# Patient Record
Sex: FEMALE | Race: ASIAN | NOT HISPANIC OR LATINO | Employment: PART TIME | URBAN - METROPOLITAN AREA
[De-identification: names, ages, dates, MRNs, and addresses within clinical notes are randomized per-mention and may not be internally consistent; named-entity substitution may affect disease eponyms.]

---

## 2017-10-14 ENCOUNTER — HOSPITAL ENCOUNTER (INPATIENT)
Facility: MEDICAL CENTER | Age: 72
LOS: 2 days | DRG: 282 | End: 2017-10-16
Attending: EMERGENCY MEDICINE | Admitting: INTERNAL MEDICINE
Payer: MEDICARE

## 2017-10-14 ENCOUNTER — HOSPITAL ENCOUNTER (OUTPATIENT)
Dept: RADIOLOGY | Facility: MEDICAL CENTER | Age: 72
End: 2017-10-14

## 2017-10-14 ENCOUNTER — RESOLUTE PROFESSIONAL BILLING HOSPITAL PROF FEE (OUTPATIENT)
Dept: HOSPITALIST | Facility: MEDICAL CENTER | Age: 72
End: 2017-10-14
Payer: MEDICARE

## 2017-10-14 DIAGNOSIS — I10 ESSENTIAL HYPERTENSION: ICD-10-CM

## 2017-10-14 DIAGNOSIS — R79.89 ELEVATED TROPONIN: ICD-10-CM

## 2017-10-14 DIAGNOSIS — I24.9 ACUTE CORONARY SYNDROME (HCC): ICD-10-CM

## 2017-10-14 PROBLEM — Z87.891 HISTORY OF TOBACCO ABUSE: Status: ACTIVE | Noted: 2017-10-14

## 2017-10-14 PROBLEM — I21.4 NSTEMI (NON-ST ELEVATED MYOCARDIAL INFARCTION) (HCC): Status: ACTIVE | Noted: 2017-10-14

## 2017-10-14 LAB
ANION GAP SERPL CALC-SCNC: 9 MMOL/L (ref 0–11.9)
APTT PPP: 231.5 SEC (ref 24.7–36)
APTT PPP: 231.6 SEC (ref 24.7–36)
BASOPHILS # BLD AUTO: 1.2 % (ref 0–1.8)
BASOPHILS # BLD: 0.1 K/UL (ref 0–0.12)
BUN SERPL-MCNC: 13 MG/DL (ref 8–22)
CALCIUM SERPL-MCNC: 8.8 MG/DL (ref 8.5–10.5)
CHLORIDE SERPL-SCNC: 106 MMOL/L (ref 96–112)
CO2 SERPL-SCNC: 23 MMOL/L (ref 20–33)
CREAT SERPL-MCNC: 0.69 MG/DL (ref 0.5–1.4)
EKG IMPRESSION: NORMAL
EOSINOPHIL # BLD AUTO: 0.22 K/UL (ref 0–0.51)
EOSINOPHIL NFR BLD: 2.7 % (ref 0–6.9)
ERYTHROCYTE [DISTWIDTH] IN BLOOD BY AUTOMATED COUNT: 46.5 FL (ref 35.9–50)
GFR SERPL CREATININE-BSD FRML MDRD: >60 ML/MIN/1.73 M 2
GLUCOSE SERPL-MCNC: 167 MG/DL (ref 65–99)
HCT VFR BLD AUTO: 43.8 % (ref 37–47)
HGB BLD-MCNC: 14.9 G/DL (ref 12–16)
IMM GRANULOCYTES # BLD AUTO: 0.05 K/UL (ref 0–0.11)
IMM GRANULOCYTES NFR BLD AUTO: 0.6 % (ref 0–0.9)
LYMPHOCYTES # BLD AUTO: 3.41 K/UL (ref 1–4.8)
LYMPHOCYTES NFR BLD: 42.3 % (ref 22–41)
MAGNESIUM SERPL-MCNC: 2 MG/DL (ref 1.5–2.5)
MCH RBC QN AUTO: 32.5 PG (ref 27–33)
MCHC RBC AUTO-ENTMCNC: 34 G/DL (ref 33.6–35)
MCV RBC AUTO: 95.6 FL (ref 81.4–97.8)
MONOCYTES # BLD AUTO: 0.52 K/UL (ref 0–0.85)
MONOCYTES NFR BLD AUTO: 6.5 % (ref 0–13.4)
NEUTROPHILS # BLD AUTO: 3.76 K/UL (ref 2–7.15)
NEUTROPHILS NFR BLD: 46.7 % (ref 44–72)
NRBC # BLD AUTO: 0 K/UL
NRBC BLD AUTO-RTO: 0 /100 WBC
PLATELET # BLD AUTO: 252 K/UL (ref 164–446)
PMV BLD AUTO: 10.8 FL (ref 9–12.9)
POTASSIUM SERPL-SCNC: 3.7 MMOL/L (ref 3.6–5.5)
RBC # BLD AUTO: 4.58 M/UL (ref 4.2–5.4)
SODIUM SERPL-SCNC: 138 MMOL/L (ref 135–145)
TROPONIN I SERPL-MCNC: 0.15 NG/ML (ref 0–0.04)
TSH SERPL DL<=0.005 MIU/L-ACNC: 1.43 UIU/ML (ref 0.3–3.7)
WBC # BLD AUTO: 8.1 K/UL (ref 4.8–10.8)

## 2017-10-14 PROCEDURE — 85730 THROMBOPLASTIN TIME PARTIAL: CPT

## 2017-10-14 PROCEDURE — 80048 BASIC METABOLIC PNL TOTAL CA: CPT

## 2017-10-14 PROCEDURE — 700101 HCHG RX REV CODE 250: Performed by: INTERNAL MEDICINE

## 2017-10-14 PROCEDURE — 85025 COMPLETE CBC W/AUTO DIFF WBC: CPT

## 2017-10-14 PROCEDURE — 700102 HCHG RX REV CODE 250 W/ 637 OVERRIDE(OP): Performed by: INTERNAL MEDICINE

## 2017-10-14 PROCEDURE — 770020 HCHG ROOM/CARE - TELE (206)

## 2017-10-14 PROCEDURE — 94760 N-INVAS EAR/PLS OXIMETRY 1: CPT

## 2017-10-14 PROCEDURE — 99223 1ST HOSP IP/OBS HIGH 75: CPT | Mod: AI | Performed by: INTERNAL MEDICINE

## 2017-10-14 PROCEDURE — 84443 ASSAY THYROID STIM HORMONE: CPT

## 2017-10-14 PROCEDURE — A9270 NON-COVERED ITEM OR SERVICE: HCPCS | Performed by: INTERNAL MEDICINE

## 2017-10-14 PROCEDURE — 700111 HCHG RX REV CODE 636 W/ 250 OVERRIDE (IP)

## 2017-10-14 PROCEDURE — 36415 COLL VENOUS BLD VENIPUNCTURE: CPT

## 2017-10-14 PROCEDURE — 93005 ELECTROCARDIOGRAM TRACING: CPT | Performed by: INTERNAL MEDICINE

## 2017-10-14 PROCEDURE — 93010 ELECTROCARDIOGRAM REPORT: CPT | Performed by: INTERNAL MEDICINE

## 2017-10-14 PROCEDURE — 83735 ASSAY OF MAGNESIUM: CPT

## 2017-10-14 PROCEDURE — 99285 EMERGENCY DEPT VISIT HI MDM: CPT

## 2017-10-14 PROCEDURE — 84484 ASSAY OF TROPONIN QUANT: CPT

## 2017-10-14 PROCEDURE — 93005 ELECTROCARDIOGRAM TRACING: CPT | Performed by: EMERGENCY MEDICINE

## 2017-10-14 PROCEDURE — 700105 HCHG RX REV CODE 258: Performed by: INTERNAL MEDICINE

## 2017-10-14 PROCEDURE — 96365 THER/PROPH/DIAG IV INF INIT: CPT

## 2017-10-14 PROCEDURE — 93005 ELECTROCARDIOGRAM TRACING: CPT

## 2017-10-14 RX ORDER — HEPARIN SODIUM 1000 [USP'U]/ML
3200 INJECTION, SOLUTION INTRAVENOUS; SUBCUTANEOUS PRN
Status: DISCONTINUED | OUTPATIENT
Start: 2017-10-14 | End: 2017-10-15

## 2017-10-14 RX ORDER — LOSARTAN POTASSIUM 50 MG/1
50 TABLET ORAL DAILY
Status: DISCONTINUED | OUTPATIENT
Start: 2017-10-14 | End: 2017-10-16

## 2017-10-14 RX ORDER — OXYCODONE HYDROCHLORIDE 5 MG/1
2.5 TABLET ORAL
Status: DISCONTINUED | OUTPATIENT
Start: 2017-10-14 | End: 2017-10-16 | Stop reason: HOSPADM

## 2017-10-14 RX ORDER — BISACODYL 10 MG
10 SUPPOSITORY, RECTAL RECTAL
Status: DISCONTINUED | OUTPATIENT
Start: 2017-10-14 | End: 2017-10-16 | Stop reason: HOSPADM

## 2017-10-14 RX ORDER — AMOXICILLIN 250 MG
2 CAPSULE ORAL 2 TIMES DAILY
Status: DISCONTINUED | OUTPATIENT
Start: 2017-10-14 | End: 2017-10-16 | Stop reason: HOSPADM

## 2017-10-14 RX ORDER — ONDANSETRON 4 MG/1
4 TABLET, ORALLY DISINTEGRATING ORAL EVERY 4 HOURS PRN
Status: DISCONTINUED | OUTPATIENT
Start: 2017-10-14 | End: 2017-10-16 | Stop reason: HOSPADM

## 2017-10-14 RX ORDER — MORPHINE SULFATE 4 MG/ML
2 INJECTION, SOLUTION INTRAMUSCULAR; INTRAVENOUS
Status: DISCONTINUED | OUTPATIENT
Start: 2017-10-14 | End: 2017-10-16 | Stop reason: HOSPADM

## 2017-10-14 RX ORDER — MORPHINE SULFATE 4 MG/ML
2-4 INJECTION, SOLUTION INTRAMUSCULAR; INTRAVENOUS
Status: DISCONTINUED | OUTPATIENT
Start: 2017-10-14 | End: 2017-10-16 | Stop reason: HOSPADM

## 2017-10-14 RX ORDER — ATORVASTATIN CALCIUM 40 MG/1
40 TABLET, FILM COATED ORAL EVERY EVENING
Status: DISCONTINUED | OUTPATIENT
Start: 2017-10-14 | End: 2017-10-15

## 2017-10-14 RX ORDER — OXYCODONE HYDROCHLORIDE 5 MG/1
5 TABLET ORAL
Status: DISCONTINUED | OUTPATIENT
Start: 2017-10-14 | End: 2017-10-16 | Stop reason: HOSPADM

## 2017-10-14 RX ORDER — NITROGLYCERIN 0.4 MG/1
0.4 TABLET SUBLINGUAL
Status: DISCONTINUED | OUTPATIENT
Start: 2017-10-14 | End: 2017-10-16 | Stop reason: HOSPADM

## 2017-10-14 RX ORDER — ONDANSETRON 2 MG/ML
4 INJECTION INTRAMUSCULAR; INTRAVENOUS EVERY 4 HOURS PRN
Status: DISCONTINUED | OUTPATIENT
Start: 2017-10-14 | End: 2017-10-15

## 2017-10-14 RX ORDER — LABETALOL HYDROCHLORIDE 5 MG/ML
10 INJECTION, SOLUTION INTRAVENOUS ONCE
Status: ACTIVE | OUTPATIENT
Start: 2017-10-14 | End: 2017-10-15

## 2017-10-14 RX ORDER — POLYETHYLENE GLYCOL 3350 17 G/17G
1 POWDER, FOR SOLUTION ORAL
Status: DISCONTINUED | OUTPATIENT
Start: 2017-10-14 | End: 2017-10-16 | Stop reason: HOSPADM

## 2017-10-14 RX ORDER — SODIUM CHLORIDE 9 MG/ML
INJECTION, SOLUTION INTRAVENOUS CONTINUOUS
Status: DISCONTINUED | OUTPATIENT
Start: 2017-10-14 | End: 2017-10-16 | Stop reason: HOSPADM

## 2017-10-14 RX ORDER — CARVEDILOL 3.12 MG/1
3.12 TABLET ORAL 2 TIMES DAILY WITH MEALS
Status: DISCONTINUED | OUTPATIENT
Start: 2017-10-14 | End: 2017-10-16

## 2017-10-14 RX ORDER — LOSARTAN POTASSIUM 50 MG/1
50 TABLET ORAL DAILY
Status: ON HOLD | COMMUNITY
End: 2017-10-16

## 2017-10-14 RX ADMIN — LOSARTAN POTASSIUM 50 MG: 50 TABLET, FILM COATED ORAL at 14:18

## 2017-10-14 RX ADMIN — HEPARIN SODIUM 1200 UNITS/HR: 5000 INJECTION, SOLUTION INTRAVENOUS at 14:11

## 2017-10-14 RX ADMIN — CARVEDILOL 3.12 MG: 3.12 TABLET, FILM COATED ORAL at 17:55

## 2017-10-14 RX ADMIN — SODIUM CHLORIDE: 9 INJECTION, SOLUTION INTRAVENOUS at 15:16

## 2017-10-14 RX ADMIN — ATORVASTATIN CALCIUM 40 MG: 40 TABLET, FILM COATED ORAL at 20:53

## 2017-10-14 ASSESSMENT — ENCOUNTER SYMPTOMS
NAUSEA: 0
DEPRESSION: 0
CHILLS: 0
FLANK PAIN: 0
PALPITATIONS: 0
HALLUCINATIONS: 0
ORTHOPNEA: 0
SORE THROAT: 0
CLAUDICATION: 0
DOUBLE VISION: 0
TINGLING: 0
FEVER: 0
DIZZINESS: 0
CONSTIPATION: 0
SPUTUM PRODUCTION: 0
DIAPHORESIS: 0
SPEECH CHANGE: 0
DIARRHEA: 0
STRIDOR: 0
COUGH: 0
MYALGIAS: 0
SHORTNESS OF BREATH: 0
NERVOUS/ANXIOUS: 0
SENSORY CHANGE: 0
BLOOD IN STOOL: 0
HEADACHES: 0
VOMITING: 0
PHOTOPHOBIA: 0
TREMORS: 0
ABDOMINAL PAIN: 0
BLURRED VISION: 0
WEAKNESS: 0

## 2017-10-14 ASSESSMENT — COGNITIVE AND FUNCTIONAL STATUS - GENERAL
SUGGESTED CMS G CODE MODIFIER MOBILITY: CH
SUGGESTED CMS G CODE MODIFIER DAILY ACTIVITY: CH
DAILY ACTIVITIY SCORE: 24
MOBILITY SCORE: 24

## 2017-10-14 ASSESSMENT — LIFESTYLE VARIABLES
EVER FELT BAD OR GUILTY ABOUT YOUR DRINKING: NO
EVER_SMOKED: YES
ALCOHOL_USE: YES
CONSUMPTION TOTAL: NEGATIVE
HAVE PEOPLE ANNOYED YOU BY CRITICIZING YOUR DRINKING: NO
EVER HAD A DRINK FIRST THING IN THE MORNING TO STEADY YOUR NERVES TO GET RID OF A HANGOVER: NO
ON A TYPICAL DAY WHEN YOU DRINK ALCOHOL HOW MANY DRINKS DO YOU HAVE: 1
SUBSTANCE_ABUSE: 0
AVERAGE NUMBER OF DAYS PER WEEK YOU HAVE A DRINK CONTAINING ALCOHOL: 1
TOTAL SCORE: 0
TOTAL SCORE: 0
HOW MANY TIMES IN THE PAST YEAR HAVE YOU HAD 5 OR MORE DRINKS IN A DAY: 0
HAVE YOU EVER FELT YOU SHOULD CUT DOWN ON YOUR DRINKING: NO
TOTAL SCORE: 0

## 2017-10-14 ASSESSMENT — PAIN SCALES - GENERAL
PAINLEVEL_OUTOF10: 0
PAINLEVEL_OUTOF10: 5
PAINLEVEL_OUTOF10: 0

## 2017-10-14 ASSESSMENT — PATIENT HEALTH QUESTIONNAIRE - PHQ9
1. LITTLE INTEREST OR PLEASURE IN DOING THINGS: NOT AT ALL
2. FEELING DOWN, DEPRESSED, IRRITABLE, OR HOPELESS: NOT AT ALL
SUM OF ALL RESPONSES TO PHQ9 QUESTIONS 1 AND 2: 0
SUM OF ALL RESPONSES TO PHQ QUESTIONS 1-9: 0

## 2017-10-14 NOTE — H&P
Hospital Medicine History and Physical    Date of Service  10/14/2017    Chief Complaint  Chief Complaint   Patient presents with   • MI (Non ST Segment Elevation MI)   • Chest Pain   • Shortness of Breath       History of Presenting Illness  72 y.o. female who presented 10/14/2017 with past medical history significant for hypertension who was transferred here from [facility] for evaluation of elevated troponin. Patient is originally from [place] and was hiking in [facility] when she started experiencing right-sided chest pressure lasted for approximately 2-1/2 hours yesterday. This morning she woke up at 3 minutes. A similar episode of chest pressure. At that time she went to outside facility for further evaluation was noted to have elevated troponin of 0.24. At outside facility she received nitroglycerin and aspirin and was transferred to our facility for cardiology evaluation. She denies any shortness of breath, fevers/chills, no vomiting, diaphoresis, palpitations, large and he denies associated with it. Patient has remote history of smoking quit 25 years ago. Patient's family history significant for father who had CABG ×2.   Primary Care Physician  No primary care provider on file.    Consultants  Dr. Mckeon - Cardiology    Code Status  DNR     Review of Systems  Review of Systems   Constitutional: Negative for chills, diaphoresis and fever.   HENT: Negative for congestion and sore throat.    Eyes: Negative for blurred vision, double vision and photophobia.   Respiratory: Negative for cough, sputum production, shortness of breath and stridor.    Cardiovascular: Positive for chest pain. Negative for palpitations, orthopnea and claudication.        Right sided chest pressure with radiation to her back   Gastrointestinal: Negative for abdominal pain, blood in stool, constipation, diarrhea, melena, nausea and vomiting.   Genitourinary: Negative for dysuria, flank pain, frequency, hematuria and urgency.    Musculoskeletal: Negative for joint pain and myalgias.   Skin: Negative for itching and rash.   Neurological: Negative for dizziness, tingling, tremors, sensory change, speech change, weakness and headaches.   Psychiatric/Behavioral: Negative for depression, hallucinations, substance abuse and suicidal ideas. The patient is not nervous/anxious.         Past Medical History  Past Medical History:   Diagnosis Date   • Hypertension        Surgical History  Past Surgical History:   Procedure Laterality Date   • TUBAL LIGATION         Medications  No current facility-administered medications on file prior to encounter.      No current outpatient prescriptions on file prior to encounter.       Family History  History reviewed. No pertinent family history.    Social History  Social History   Substance Use Topics   • Smoking status: Former Smoker     Quit date: 10/14/1992   • Smokeless tobacco: Never Used   • Alcohol use Yes      Comment: occ       Allergies  Allergies   Allergen Reactions   • Sulfa Drugs Rash and Itching     Full body blistering rash        Physical Exam  Laboratory   Hemodynamics  Temp (24hrs), Av.1 °C (98.7 °F), Min:37.1 °C (98.7 °F), Max:37.1 °C (98.7 °F)   Temperature: 37.1 °C (98.7 °F)  Pulse  Av.5  Min: 69  Max: 72 Heart Rate (Monitored): 68  Blood Pressure : (!) 177/94, NIBP: (!) 186/82      Respiratory      Respiration: 12, Pulse Oximetry: 96 %             Physical Exam   Constitutional: She is oriented to person, place, and time. She appears well-developed and well-nourished. No distress.   HENT:   Head: Normocephalic and atraumatic.   Right Ear: External ear normal.   Left Ear: External ear normal.   Mouth/Throat: Oropharynx is clear and moist. No oropharyngeal exudate.   Eyes: Conjunctivae and EOM are normal. Pupils are equal, round, and reactive to light. Right eye exhibits no discharge. Left eye exhibits no discharge. No scleral icterus.   Neck: Neck supple. No JVD present. No  thyromegaly present.   Cardiovascular: Normal rate, regular rhythm and normal heart sounds.    No murmur heard.  Pulmonary/Chest: Effort normal and breath sounds normal. No stridor. No respiratory distress. She has no wheezes. She has no rales.   Abdominal: Soft. Bowel sounds are normal. She exhibits no distension. There is no tenderness. There is no rebound and no guarding.   Musculoskeletal: Normal range of motion. She exhibits no edema.   Neurological: She is alert and oriented to person, place, and time. No cranial nerve deficit.   Skin: Skin is warm and dry. No rash noted. She is not diaphoretic. No erythema.   Psychiatric: She has a normal mood and affect. Her behavior is normal. Thought content normal.       Recent Labs      10/14/17   1238   WBC  8.1   RBC  4.58   HEMOGLOBIN  14.9   HEMATOCRIT  43.8   MCV  95.6   MCH  32.5   MCHC  34.0   RDW  46.5   PLATELETCT  252   MPV  10.8     Recent Labs      10/14/17   1238   SODIUM  138   POTASSIUM  3.7   CHLORIDE  106   CO2  23   GLUCOSE  167*   BUN  13   CREATININE  0.69   CALCIUM  8.8     Recent Labs      10/14/17   1238   GLUCOSE  167*                 No results found for: TROPONINI  Urinalysis:  No results found for: SPECGRAVITY, GLUCOSEUR, KETONES, NITRITE, WBCURINE, RBCURINE, BACTERIA, EPITHELCELL     Imaging  CXR Outside facility: Showed no acute findings.     EKG per my read shows sinus rhythm, rate of 70, QTC prolonged at 501. Flattening of T waves noted diffusely.    Assessment/Plan     I anticipate this patient will require at least two midnights for appropriate medical management, necessitating inpatient admission.    Elevated troponin   Assessment & Plan    Continue trend patient's troponin and EKG, monitor patient closely on telemetry.        HTN (hypertension)   Assessment & Plan    Continue patient on Cozaar. Patient's blood pressure uncontrolled, patient started on Coreg 3.125 mg twice a day with holding parameters.        History of tobacco abuse    Assessment & Plan    Patient has a remote history of tobacco use quit 25 years ago. She preceded to smoke 1 pack a day per week for 20 years.        NSTEMI (non-ST elevated myocardial infarction) (CMS-Formerly [facility])- (present on admission)   Assessment & Plan    Patient transferred from outside facility for elevated troponin and right sided chest presssure. Patient on heparin drip. Cardiology. Dr. Mckeon consulted by ER physician. Additional management per cardiology recommendations. Patient started on Lipitor 40 mg, aspirin therapy. Patient is also started on morphine and nitroglycerin PRN. Echocardiogram ordered. Monitor patient closely in telemetry and trend patient's troponin.              VTE prophylaxis: Heparin drip.       This dictation was created using voice recognition software. The accuracy of the dictation is limited to the abilities of the software. I expect there may be some errors of grammar and possibly content.

## 2017-10-14 NOTE — ED NOTES
73 y/o female bib [facility] as a transfer from [facility] for evaluation of a NSTEMI. Pt reported having sob and chest tightness yesterday and then again today prompting her visit to the ED in [place]. Pt troponin was elevated at 0.24, she was started on a heparin drip at 1000 U/hr

## 2017-10-14 NOTE — CONSULTS
"Cardiology Consult Note    DOS: 10/14/2017    Consulting physician: Redd Lazaro    Chief complaint/Reason for consult: NSTEMI    HPI:  Patient is a 72  female with history of HTN who was in her usual state of health until yesterday afternoon. She was out for a walk at 11,000 feet, taking pictures of some 5000 year old trees. She says as soon as she got out of the vehicle and took a couple of steps she started having chest discomfort that was 5/10 in severity that she describes as a \"heaviness\". She was unable to go any further and then went back into the car. She was also significantly short of breath and she went back to her vehicle. She came down to 7000 ft and went back to the [facility] in [place] and was doing well until she woke up with more SOB and chest discomfort at 3 AM. She went to local ED, received nitro at which point her pain resolved. She was found to be trop positive there and was subsequently transferred to [facility].    ROS (+ highlighted in red):  Constitutional: Fevers/chills/fatigue/weightloss  HEENT: Blurry vision/eye pain/sore throat/hearing loss  Respiratory: Shortness of breath/cough  Cardiovascular: Chest pain/palpitations/edema/orthopnea/syncope  GI: Nausea/vomitting/diarrhea  MSK: Arthralgias/myagias/muscle weakness  Skin: Rash/sores  Neurological: Numbness/tremors/vertigo  Endocrine: Excessive thirst/polyuria/cold intolerance/heat intolerance  Psych: Depression/anxiety    Past Medical History:   Diagnosis Date   • Hypertension        Past Surgical History:   Procedure Laterality Date   • TUBAL LIGATION         Social History     Social History   • Marital status:      Spouse name: N/A   • Number of children: N/A   • Years of education: N/A     Occupational History   • Not on file.     Social History Main Topics   • Smoking status: Former Smoker     Quit date: 10/14/1992   • Smokeless tobacco: Never Used   • Alcohol use Yes      Comment: occ   • Drug use: No   • Sexual activity: " Not on file     Other Topics Concern   • Not on file     Social History Narrative   • No narrative on file       History reviewed. No pertinent family history.    Allergies   Allergen Reactions   • Sulfa Drugs Rash and Itching     Full body blistering rash       Current Facility-Administered Medications   Medication Dose Route Frequency Provider Last Rate Last Dose   • losartan (COZAAR) tablet 50 mg  50 mg Oral DAILY Beatriz Pérez M.D.   50 mg at 10/14/17 1418   • senna-docusate (PERICOLACE or SENOKOT S) 8.6-50 MG per tablet 2 Tab  2 Tab Oral BID Beatriz Pérez M.D.        And   • polyethylene glycol/lytes (MIRALAX) PACKET 1 Packet  1 Packet Oral QDAY PRN Beatriz Pérez M.D.        And   • magnesium hydroxide (MILK OF MAGNESIA) suspension 30 mL  30 mL Oral QDAY PRN Beatriz Pérez M.D.        And   • bisacodyl (DULCOLAX) suppository 10 mg  10 mg Rectal QDAY PRN Beatriz Pérez M.D.       • NS infusion   Intravenous Continuous Beatriz Pérez M.D.       • nitroglycerin (NITROSTAT) tablet 0.4 mg  0.4 mg Sublingual Q5 MIN PRN Beatriz Pérez M.D.       • morphine (pf) 4 mg/ml injection 2-4 mg  2-4 mg Intravenous Q5 MIN PRN Beatriz Pérez M.D.       • Pharmacy Consult Request ...Pain Management Review   Other PRN Beatriz Pérez M.D.        And   • oxycodone immediate-release (ROXICODONE) tablet 2.5 mg  2.5 mg Oral Q3HRS PRN Beatriz Pérez M.D.        And   • oxycodone immediate-release (ROXICODONE) tablet 5 mg  5 mg Oral Q3HRS PRN Beatriz Pérez M.D.        And   • morphine (pf) 4 mg/ml injection 2 mg  2 mg Intravenous Q3HRS PRN Beatriz Pérez M.D.       • carvedilol (COREG) tablet 3.125 mg  3.125 mg Oral BID WITH MEALS Beatriz Pérez M.D.       • atorvastatin (LIPITOR) tablet 40 mg  40 mg Oral Q EVENING Beatriz Pérez M.D.       • [START ON 10/15/2017] aspirin EC (ECOTRIN) tablet 81 mg  81 mg Oral DAILY Beatriz Pérez M.D.       • ondansetron (ZOFRAN) syringe/vial injection 4 mg  4 mg Intravenous Q4HRS PRN Beatriz Pérez M.D.       • ondansetron (ZOFRAN ODT) dispertab  4 mg  4 mg Oral Q4HRS PRN Beatriz Pérez M.D.       • heparin 1000 units/mL injection 3,200 Units  3,200 Units Intravenous PRN Marlen Gomez PharmD        And   • heparin infusion 25,000 units in 500 ml 0.45% nacl   Intravenous Continuous Marlen Gomez PharmD 24 mL/hr at 10/14/17 1411 1,200 Units/hr at 10/14/17 1411   • labetalol (NORMODYNE,TRANDATE) injection 10 mg  10 mg Intravenous Once Beatriz Pérez M.D.           Physical Exam:  Vitals:    10/14/17 1231 10/14/17 1301 10/14/17 1331 10/14/17 1401   BP:       Pulse: 71 72 69 65   Resp: 19 12 15 15   Temp:       SpO2: 97% 96% 97% 97%   Weight:       Height:         General appearance: NAD, conversant   Eyes: anicteric sclerae, moist conjunctivae; no lid-lag; PERRLA  HENT: Atraumatic; oropharynx clear with moist mucous membranes and no mucosal ulcerations; normal hard and soft palate  Neck: Trachea midline; FROM, supple, no thyromegaly or lymphadenopathy  Lungs: CTA, with normal respiratory effort and no intercostal retractions  CV: RRR, no MRGs, no JVD   Abdomen: Soft, non-tender; no masses or HSM  Extremities: No peripheral edema or extremity lymphadenopathy  Skin: Normal temperature, turgor and texture; no rash, ulcers or subcutaneous nodules  Psych: Appropriate affect, alert and oriented to person, place and time    Data:  Labs reviewed  Trop 0.25 - > 0.15    CXR interpreted by me:  WNL    EKG interpreted by me:   Sinus, nonspecific tw changes     Impression/Plan:  1)ACS/NSTEMI  2)HTN    -Given her recurrent CP episode, positive cardiac enzymes, presentation consistent with ACS/NSTEMI  -Agree with ASA/statin/heparin  -Awaiting echo  -Will plan for coronary angiography +/- PCI if appropriate likely tomorrow or Monday, sooner if she becomes unstable  -Risks and benefits and all pt's questions regarding procedure were answered, she agrees with plan     Jacqui Mckeon MD

## 2017-10-14 NOTE — ASSESSMENT & PLAN NOTE
- continue cozaar, and coreg. Start norvasc 5mg daily. Continue to monitor BP trend. Continue PRN IV labetalol for significant hypertension.

## 2017-10-14 NOTE — PROGRESS NOTES
Patient up to unit with Heparin gtt running at 1200 units/hr (24ml)   Critical lab result of APTT 231.6 called by lab. Heparin gtt started at [facility]. ER began Heparin weight based protocol initiation using the 71-85 line per pharmacy. Followed up with Nick FRANCO pharmacist regarding APTT. Per Nick, resume Heparin protocol. Per protocol, no change in heparin weight require if APTT is , Order for APTT in for 6 hours from now.

## 2017-10-14 NOTE — ASSESSMENT & PLAN NOTE
- continue ASA. High intensity lipitor for now. Continue coreg and cozaar.  - await for LHC. Maintain on heparin gtt.   - PRN NTG and morphine for CP recurrence. Monitor on telemetry.

## 2017-10-14 NOTE — ED PROVIDER NOTES
ED Provider Note    Scribed for Redd Lazaro M.D. by Martha Paredes. 10/14/2017  12:14 PM    Primary care provider: [facility]   Means of arrival: Ambulance  History obtained from: Patient  History limited by: None    CHIEF COMPLAINT  Chief Complaint   Patient presents with   • Chest Pain       HPI  Sabrina Aceves is a 72 y.o. female who presents to the ED by ambulance for chest pain with an onset of 1 day.  Patient was hiking at 10,000 feet yesterday when she developed a sudden onset of chest pain and shortness of breath.  Symptoms were described as tightness to her midsternum for approximately two hours.  Upon waking up at 3:00 AM this morning, she experienced a second episode of chest pain. She was evaluated at [facility] where she was treated with Nitroglycerin and Aspirin.  Her troponin was elevated at 0.24, and she was transferred for specialized care.  Her chest pain is resolved at this time. She denies leg swelling, abdominal pain, back pain or nausea.  Negative CAD, MI or stent placement.  Patient had a stress test completed several years ago.  Patient is prediabetic and has a history of hypertension.  No history of hyperlipidemia, DVT or PE.  She was a former smoker and quit 25 years ago.  Positive family history of cardiac disease and bypass in father.        REVIEW OF SYSTEMS  Pertinent positives include: midsternum chest pain and shortness of breath.  Pertinent negatives include: leg swelling, abdominal pain, back pain or nausea.  10+ systems reviewed and negative.  See HPI for further details. C.      PAST MEDICAL HISTORY  Past Medical History:   Diagnosis Date   • Hypertension    Negative CAD, MI or stent placement.  Patient had a stress test completed several years ago.  Patient is prediabetic.  No history of hyperlipidemia, DVT or PE.  She was a former smoker and quit 25 years ago.       FAMILY HISTORY  History reviewed. Positive family history of cardiac disease and bypass in  "father.        SOCIAL HISTORY  Social History   Substance Use Topics   • Smoking status: Former Smoker     Quit date: 10/14/1992   • Smokeless tobacco: Never Used   • Alcohol use Yes      Comment: occ     History   Drug Use No   Patient resides in [place].      SURGICAL HISTORY  Past Surgical History:   Procedure Laterality Date   • TUBAL LIGATION         CURRENT MEDICATIONS  Home Medications     Reviewed by Tawanda Holland R.N. (Registered Nurse) on 10/14/17 at 1152  Med List Status: Complete   Medication Last Dose Status   losartan (COZAAR) 50 MG Tab 10/11/2017 Active            Patient is on Zyrtec.  She received Heparin, Nitrostat, Saline and Aspirin at [facility].      ALLERGIES  Allergies   Allergen Reactions   • Sulfa Drugs Rash and Itching     Full body blistering rash       PHYSICAL EXAM  VITAL SIGNS: BP (!) 177/94   Pulse 70   Temp 37.1 °C (98.7 °F)   Resp 16   Ht 1.626 m (5' 4\")   Wt 86.1 kg (189 lb 13.1 oz)   SpO2 96%   BMI 32.58 kg/m²       Reviewed andHypertensive    Constitutional: Well developed, Well nourished.  HENT: Normocephalic, atraumatic, bilateral external ears normal, oropharynx moist, No exudates or erythema.   Eyes: PERRLA, conjunctiva pink, no scleral icterus.   Cardiovascular: Regular rate and rhythm. No murmurs, rubs or gallops.   Respiratory: Lungs clear to auscultation bilaterally. No wheezes, rales, or rhonchi.   Gastrointestinal:  Abdomen soft, non-tender, non distended.   Musculoskeletal: No leg swelling. Radial pulses intact.  Skin: No erythema, no rash.   Genitourinary: No costovertebral angle tenderness.   Neurologic: Alert & oriented x 3, cranial nerves 2-12 intact by passive exam.  No focal deficit noted.  Psychiatric: Affect normal, Judgment normal, Mood normal.       DIFFERENTIAL DIAGNOSIS:  Non-STEMI, ST elevation MI, congestive heart failure, chest wall pain, doubt renal insufficiency.      EKG  EKG Interpretation:  Interpreted by me    Rhythm:  Normal " sinus rhythm   Rate: 70  Axis: normal  Ectopy: none  Conduction: normal  ST Segments: no acute change  T Waves: no acute change  Q Waves: Inferior Q waves  Poor R wave progression.  Nonspecific diffuse flattening.   Clinical Impression: Normal EKG without acute changes       RADIOLOGY/PROCEDURES  OUTSIDE IMAGES-DX CHEST   Final Result     XR chest from outlying facility: mild atelectasis left base.      All radiology interpreted by the radiologist and all the readings reviewed by me.       LABORATORY:  Results for orders placed or performed during the hospital encounter of 10/14/17   CBC WITH DIFFERENTIAL   Result Value Ref Range    WBC 8.1 4.8 - 10.8 K/uL    RBC 4.58 4.20 - 5.40 M/uL    Hemoglobin 14.9 12.0 - 16.0 g/dL    Hematocrit 43.8 37.0 - 47.0 %    MCV 95.6 81.4 - 97.8 fL    MCH 32.5 27.0 - 33.0 pg    MCHC 34.0 33.6 - 35.0 g/dL    RDW 46.5 35.9 - 50.0 fL    Platelet Count 252 164 - 446 K/uL    MPV 10.8 9.0 - 12.9 fL    Neutrophils-Polys 46.70 44.00 - 72.00 %    Lymphocytes 42.30 (H) 22.00 - 41.00 %    Monocytes 6.50 0.00 - 13.40 %    Eosinophils 2.70 0.00 - 6.90 %    Basophils 1.20 0.00 - 1.80 %    Immature Granulocytes 0.60 0.00 - 0.90 %    Nucleated RBC 0.00 /100 WBC    Neutrophils (Absolute) 3.76 2.00 - 7.15 K/uL    Lymphs (Absolute) 3.41 1.00 - 4.80 K/uL    Monos (Absolute) 0.52 0.00 - 0.85 K/uL    Eos (Absolute) 0.22 0.00 - 0.51 K/uL    Baso (Absolute) 0.10 0.00 - 0.12 K/uL    Immature Granulocytes (abs) 0.05 0.00 - 0.11 K/uL    NRBC (Absolute) 0.00 K/uL   BASIC METABOLIC PANEL   Result Value Ref Range    Sodium 138 135 - 145 mmol/L    Potassium 3.7 3.6 - 5.5 mmol/L    Chloride 106 96 - 112 mmol/L    Co2 23 20 - 33 mmol/L    Glucose 167 (H) 65 - 99 mg/dL    Bun 13 8 - 22 mg/dL    Creatinine 0.69 0.50 - 1.40 mg/dL    Calcium 8.8 8.5 - 10.5 mg/dL    Anion Gap 9.0 0.0 - 11.9   TSH   Result Value Ref Range    TSH 1.430 0.300 - 3.700 uIU/mL   MAGNESIUM   Result Value Ref Range    Magnesium 2.0 1.5 - 2.5 mg/dL    TROPONIN   Result Value Ref Range    Troponin I 0.15 (H) 0.00 - 0.04 ng/mL      All labs were reviewed by me.      INTERVENTIONS:  MD ALERT...HEPARIN WEIGHT BASED PROTOCOL Pharmacist to implement (not administered)       COURSE & MEDICAL DECISION MAKING  Pertinent Labs & Imaging studies reviewed. (See chart for details)    Reviewed patient's records from [facility].  XR chest indicated mild atelectasis to the left base.  Troponin 0.24.  CBC normal. BNP 1434.  Glucose high at 112.  Remaining panel was normal.    12:14 PM Patient seen and examined at bedside. Patient presents for chest pain.     Initial orders in the Emergency Department included EKG and laboratory testing: CBC with differential, BMP, APTT, TSH and magnesium.  Plan for admission.    12:24 PM Paged Hospitalist and Cardiology.    12:33 PM Spoke with Dr. Mckeon, Cardiology, regarding the patient's presenting symptoms and evaluation and [facility].  He will consult.    12:45 PM Spoke with Dr. Pérez, Hospitalist, regarding the patient's outlying facility evaluation and presenting symptoms.  She will consult and admit the patient for further evaluation and care.      Decision Making:  This patient presents with elevated troponin after 2 episodes of chest pain with exertion at significant altitude. This may represent angina, acute coronary syndrome or non-ST elevation MI. It is reassuring that her troponin level seemed to be dropping.    PLAN:  Observation on telemetry  Serial troponins  Cardiac stress testing  Cardiology consultation      CONDITION: Guarded.      FINAL IMPRESSION  1. Acute coronary syndrome (CMS-HCC)    2. Elevated troponin    3. Essential hypertension            The note accurately reflects work and decisions made by me.  Redd Lazaro  10/14/2017  3:22 PM      Martha HOOD (Scribe), am scribing for, and in the presence of, Redd Lazaro M.D.    Electronically signed by: Martha Paredes (Scribapril), 10/14/2017    Redd HOOD  M.D. personally performed the services described in this documentation, as scribed by Martha Paredes in my presence, and it is both accurate and complete.

## 2017-10-15 PROBLEM — R73.9 HYPERGLYCEMIA: Status: ACTIVE | Noted: 2017-10-15

## 2017-10-15 LAB
ANION GAP SERPL CALC-SCNC: 6 MMOL/L (ref 0–11.9)
APTT PPP: 106.1 SEC (ref 24.7–36)
APTT PPP: 84.7 SEC (ref 24.7–36)
BUN SERPL-MCNC: 15 MG/DL (ref 8–22)
CALCIUM SERPL-MCNC: 8.7 MG/DL (ref 8.5–10.5)
CHLORIDE SERPL-SCNC: 108 MMOL/L (ref 96–112)
CHOLEST SERPL-MCNC: 164 MG/DL (ref 100–199)
CO2 SERPL-SCNC: 25 MMOL/L (ref 20–33)
CREAT SERPL-MCNC: 0.66 MG/DL (ref 0.5–1.4)
EKG IMPRESSION: NORMAL
ERYTHROCYTE [DISTWIDTH] IN BLOOD BY AUTOMATED COUNT: 44.8 FL (ref 35.9–50)
EST. AVERAGE GLUCOSE BLD GHB EST-MCNC: 134 MG/DL
GFR SERPL CREATININE-BSD FRML MDRD: >60 ML/MIN/1.73 M 2
GLUCOSE SERPL-MCNC: 125 MG/DL (ref 65–99)
HBA1C MFR BLD: 6.3 % (ref 0–5.6)
HCT VFR BLD AUTO: 42 % (ref 37–47)
HDLC SERPL-MCNC: 46 MG/DL
HGB BLD-MCNC: 14.2 G/DL (ref 12–16)
LDLC SERPL CALC-MCNC: 89 MG/DL
LV EJECT FRACT  99904: 55
LV EJECT FRACT MOD 2C 99903: 45.25
LV EJECT FRACT MOD 4C 99902: 51.79
LV EJECT FRACT MOD BP 99901: 45.08
MCH RBC QN AUTO: 31.9 PG (ref 27–33)
MCHC RBC AUTO-ENTMCNC: 33.8 G/DL (ref 33.6–35)
MCV RBC AUTO: 94.4 FL (ref 81.4–97.8)
PLATELET # BLD AUTO: 260 K/UL (ref 164–446)
PMV BLD AUTO: 10.4 FL (ref 9–12.9)
POTASSIUM SERPL-SCNC: 3.8 MMOL/L (ref 3.6–5.5)
RBC # BLD AUTO: 4.45 M/UL (ref 4.2–5.4)
SODIUM SERPL-SCNC: 139 MMOL/L (ref 135–145)
TRIGL SERPL-MCNC: 143 MG/DL (ref 0–149)
TROPONIN I SERPL-MCNC: 0.04 NG/ML (ref 0–0.04)
TROPONIN I SERPL-MCNC: 0.07 NG/ML (ref 0–0.04)
WBC # BLD AUTO: 8.5 K/UL (ref 4.8–10.8)

## 2017-10-15 PROCEDURE — 85027 COMPLETE CBC AUTOMATED: CPT

## 2017-10-15 PROCEDURE — 700111 HCHG RX REV CODE 636 W/ 250 OVERRIDE (IP): Performed by: INTERNAL MEDICINE

## 2017-10-15 PROCEDURE — 83036 HEMOGLOBIN GLYCOSYLATED A1C: CPT

## 2017-10-15 PROCEDURE — C1769 GUIDE WIRE: HCPCS

## 2017-10-15 PROCEDURE — 85730 THROMBOPLASTIN TIME PARTIAL: CPT

## 2017-10-15 PROCEDURE — 93306 TTE W/DOPPLER COMPLETE: CPT | Mod: 26 | Performed by: INTERNAL MEDICINE

## 2017-10-15 PROCEDURE — 99152 MOD SED SAME PHYS/QHP 5/>YRS: CPT

## 2017-10-15 PROCEDURE — 700117 HCHG RX CONTRAST REV CODE 255: Performed by: INTERNAL MEDICINE

## 2017-10-15 PROCEDURE — 84484 ASSAY OF TROPONIN QUANT: CPT | Mod: 91

## 2017-10-15 PROCEDURE — 700101 HCHG RX REV CODE 250

## 2017-10-15 PROCEDURE — 360979 HCHG DIAGNOSTIC CATH

## 2017-10-15 PROCEDURE — 4A023N7 MEASUREMENT OF CARDIAC SAMPLING AND PRESSURE, LEFT HEART, PERCUTANEOUS APPROACH: ICD-10-PCS | Performed by: INTERNAL MEDICINE

## 2017-10-15 PROCEDURE — 93306 TTE W/DOPPLER COMPLETE: CPT

## 2017-10-15 PROCEDURE — 80061 LIPID PANEL: CPT

## 2017-10-15 PROCEDURE — 700102 HCHG RX REV CODE 250 W/ 637 OVERRIDE(OP): Performed by: INTERNAL MEDICINE

## 2017-10-15 PROCEDURE — 80048 BASIC METABOLIC PNL TOTAL CA: CPT

## 2017-10-15 PROCEDURE — 770020 HCHG ROOM/CARE - TELE (206)

## 2017-10-15 PROCEDURE — A9270 NON-COVERED ITEM OR SERVICE: HCPCS | Performed by: INTERNAL MEDICINE

## 2017-10-15 PROCEDURE — B2111ZZ FLUOROSCOPY OF MULTIPLE CORONARY ARTERIES USING LOW OSMOLAR CONTRAST: ICD-10-PCS | Performed by: INTERNAL MEDICINE

## 2017-10-15 PROCEDURE — 700105 HCHG RX REV CODE 258: Performed by: INTERNAL MEDICINE

## 2017-10-15 PROCEDURE — 36415 COLL VENOUS BLD VENIPUNCTURE: CPT

## 2017-10-15 PROCEDURE — 700111 HCHG RX REV CODE 636 W/ 250 OVERRIDE (IP)

## 2017-10-15 PROCEDURE — 99233 SBSQ HOSP IP/OBS HIGH 50: CPT | Performed by: INTERNAL MEDICINE

## 2017-10-15 PROCEDURE — B2151ZZ FLUOROSCOPY OF LEFT HEART USING LOW OSMOLAR CONTRAST: ICD-10-PCS | Performed by: INTERNAL MEDICINE

## 2017-10-15 PROCEDURE — 93458 L HRT ARTERY/VENTRICLE ANGIO: CPT

## 2017-10-15 PROCEDURE — 307093 HCHG TR BAND RADIAL

## 2017-10-15 PROCEDURE — C1894 INTRO/SHEATH, NON-LASER: HCPCS

## 2017-10-15 RX ORDER — HALOPERIDOL 5 MG/ML
1 INJECTION INTRAMUSCULAR EVERY 6 HOURS PRN
Status: DISCONTINUED | OUTPATIENT
Start: 2017-10-15 | End: 2017-10-16 | Stop reason: HOSPADM

## 2017-10-15 RX ORDER — LIDOCAINE HYDROCHLORIDE 20 MG/ML
INJECTION, SOLUTION INFILTRATION; PERINEURAL
Status: COMPLETED
Start: 2017-10-15 | End: 2017-10-15

## 2017-10-15 RX ORDER — DIPHENHYDRAMINE HYDROCHLORIDE 50 MG/ML
25 INJECTION INTRAMUSCULAR; INTRAVENOUS EVERY 6 HOURS PRN
Status: DISCONTINUED | OUTPATIENT
Start: 2017-10-15 | End: 2017-10-16 | Stop reason: HOSPADM

## 2017-10-15 RX ORDER — DEXAMETHASONE SODIUM PHOSPHATE 4 MG/ML
4 INJECTION, SOLUTION INTRA-ARTICULAR; INTRALESIONAL; INTRAMUSCULAR; INTRAVENOUS; SOFT TISSUE
Status: DISCONTINUED | OUTPATIENT
Start: 2017-10-15 | End: 2017-10-16 | Stop reason: HOSPADM

## 2017-10-15 RX ORDER — HYDRALAZINE HYDROCHLORIDE 20 MG/ML
INJECTION INTRAMUSCULAR; INTRAVENOUS
Status: COMPLETED
Start: 2017-10-15 | End: 2017-10-15

## 2017-10-15 RX ORDER — HEPARIN SODIUM,PORCINE 1000/ML
VIAL (ML) INJECTION
Status: COMPLETED
Start: 2017-10-15 | End: 2017-10-15

## 2017-10-15 RX ORDER — MIDAZOLAM HYDROCHLORIDE 1 MG/ML
INJECTION INTRAMUSCULAR; INTRAVENOUS
Status: COMPLETED
Start: 2017-10-15 | End: 2017-10-15

## 2017-10-15 RX ORDER — ATORVASTATIN CALCIUM 80 MG/1
80 TABLET, FILM COATED ORAL EVERY EVENING
Status: DISCONTINUED | OUTPATIENT
Start: 2017-10-15 | End: 2017-10-16

## 2017-10-15 RX ORDER — AMLODIPINE BESYLATE 5 MG/1
5 TABLET ORAL
Status: DISCONTINUED | OUTPATIENT
Start: 2017-10-15 | End: 2017-10-16 | Stop reason: HOSPADM

## 2017-10-15 RX ORDER — SCOLOPAMINE TRANSDERMAL SYSTEM 1 MG/1
1 PATCH, EXTENDED RELEASE TRANSDERMAL
Status: DISCONTINUED | OUTPATIENT
Start: 2017-10-15 | End: 2017-10-16 | Stop reason: HOSPADM

## 2017-10-15 RX ORDER — VERAPAMIL HYDROCHLORIDE 2.5 MG/ML
INJECTION, SOLUTION INTRAVENOUS
Status: COMPLETED
Start: 2017-10-15 | End: 2017-10-15

## 2017-10-15 RX ADMIN — LOSARTAN POTASSIUM 50 MG: 50 TABLET, FILM COATED ORAL at 09:18

## 2017-10-15 RX ADMIN — FENTANYL CITRATE 100 MCG: 50 INJECTION, SOLUTION INTRAMUSCULAR; INTRAVENOUS at 17:04

## 2017-10-15 RX ADMIN — ATORVASTATIN CALCIUM 80 MG: 80 TABLET, FILM COATED ORAL at 22:12

## 2017-10-15 RX ADMIN — AMLODIPINE BESYLATE 5 MG: 5 TABLET ORAL at 09:18

## 2017-10-15 RX ADMIN — ASPIRIN 81 MG: 81 TABLET, COATED ORAL at 09:18

## 2017-10-15 RX ADMIN — VERAPAMIL HYDROCHLORIDE 2.5 MG: 2.5 INJECTION, SOLUTION INTRAVENOUS at 17:04

## 2017-10-15 RX ADMIN — LIDOCAINE HYDROCHLORIDE: 20 INJECTION, SOLUTION INFILTRATION; PERINEURAL at 17:04

## 2017-10-15 RX ADMIN — SODIUM CHLORIDE: 9 INJECTION, SOLUTION INTRAVENOUS at 14:48

## 2017-10-15 RX ADMIN — IOHEXOL 61 ML: 350 INJECTION, SOLUTION INTRAVENOUS at 16:14

## 2017-10-15 RX ADMIN — MIDAZOLAM 2 MG: 1 INJECTION INTRAMUSCULAR; INTRAVENOUS at 17:04

## 2017-10-15 RX ADMIN — IOHEXOL 90 ML: 350 INJECTION, SOLUTION INTRAVENOUS at 17:22

## 2017-10-15 RX ADMIN — HEPARIN SODIUM: 1000 INJECTION, SOLUTION INTRAVENOUS; SUBCUTANEOUS at 17:07

## 2017-10-15 RX ADMIN — HYDRALAZINE HYDROCHLORIDE 10 MG: 20 INJECTION, SOLUTION INTRAMUSCULAR; INTRAVENOUS at 17:16

## 2017-10-15 RX ADMIN — NITROGLYCERIN 10 ML: 20 INJECTION INTRAVENOUS at 17:04

## 2017-10-15 RX ADMIN — SODIUM CHLORIDE: 9 INJECTION, SOLUTION INTRAVENOUS at 02:23

## 2017-10-15 RX ADMIN — CARVEDILOL 3.12 MG: 3.12 TABLET, FILM COATED ORAL at 17:55

## 2017-10-15 RX ADMIN — CARVEDILOL 3.12 MG: 3.12 TABLET, FILM COATED ORAL at 09:18

## 2017-10-15 RX ADMIN — ENOXAPARIN SODIUM 40 MG: 100 INJECTION SUBCUTANEOUS at 17:56

## 2017-10-15 RX ADMIN — HEPARIN SODIUM 2000 UNITS: 200 INJECTION, SOLUTION INTRAVENOUS at 17:05

## 2017-10-15 ASSESSMENT — ENCOUNTER SYMPTOMS
COUGH: 0
ORTHOPNEA: 0
SHORTNESS OF BREATH: 0
PND: 0
PALPITATIONS: 0
CLAUDICATION: 0

## 2017-10-15 ASSESSMENT — PAIN SCALES - GENERAL
PAINLEVEL_OUTOF10: 0

## 2017-10-15 ASSESSMENT — PATIENT HEALTH QUESTIONNAIRE - PHQ9
SUM OF ALL RESPONSES TO PHQ QUESTIONS 1-9: 0
2. FEELING DOWN, DEPRESSED, IRRITABLE, OR HOPELESS: NOT AT ALL
SUM OF ALL RESPONSES TO PHQ9 QUESTIONS 1 AND 2: 0
1. LITTLE INTEREST OR PLEASURE IN DOING THINGS: NOT AT ALL

## 2017-10-15 NOTE — PROGRESS NOTES
[facility] Hospitalist Progress Note    Date of Service: 10/15/2017    Chief Complaint  72 y.o. female with HTN, remote history of smoking, admitted 10/14/2017 with CP. Transferred from [facility], noted to have elevated troponin there of 0.24. Received ASA and NTG, and transferred. Labs unimpressive except elevated BG. Cardiology consulted. Started on heparin gtt, continued on ASA and statin.     Interval Problem Update  10/15/2017 - uneventful night. VSS. Afebrile. Saturating well on RA. BP elevated.  Labs remain unimpressive. Trop peaked. Cards planning on cath today or tomorrow. Echo pending.     > Seen and examined. Comfortable. Denied any pain. No nausea, vomiting, abd pain, CP, SOB. No leg edema. In good spirits.           Consultants/Specialty  Cardiology     Disposition  Monitor on telemetry        ROS     Pertinent positives/negatives as mentioned above.     A complete review of systems was done. All other systems were negative.      Physical Exam  Laboratory/Imaging   Hemodynamics  Temp (24hrs), Av.7 °C (98 °F), Min:36.2 °C (97.1 °F), Max:37.1 °C (98.7 °F)   Temperature: 36.4 °C (97.6 °F)  Pulse  Av.6  Min: 65  Max: 74 Heart Rate (Monitored): 64  Blood Pressure : (!) 181/96 (Rn informed), NIBP: (!) 190/92      Respiratory      Respiration: 19, Pulse Oximetry: 92 %             Fluids    Intake/Output Summary (Last 24 hours) at 10/15/17 0804  Last data filed at 10/15/17 0400   Gross per 24 hour   Intake             1521 ml   Output                0 ml   Net             1521 ml       Nutrition  Orders Placed This Encounter   Procedures   • DIET ORDER     Standing Status:   Standing     Number of Occurrences:   1     Order Specific Question:   Diet:     Answer:   Cardiac [6]     Physical Exam   Constitutional: She is oriented to person, place, and time. She appears well-developed and well-nourished. No distress.   HENT:   Head: Normocephalic and atraumatic.   Mouth/Throat: No oropharyngeal exudate.    Eyes: Conjunctivae are normal. Pupils are equal, round, and reactive to light. No scleral icterus.   Neck: Normal range of motion. Neck supple.   Cardiovascular: Normal rate and regular rhythm.  Exam reveals no gallop and no friction rub.    No murmur heard.  Pulmonary/Chest: Effort normal and breath sounds normal. No respiratory distress. She has no wheezes. She has no rales. She exhibits no tenderness.   Abdominal: Soft. Bowel sounds are normal. She exhibits no distension. There is no tenderness. There is no rebound and no guarding.   Musculoskeletal: Normal range of motion. She exhibits no edema or tenderness.   Lymphadenopathy:     She has no cervical adenopathy.   Neurological: She is alert and oriented to person, place, and time. No cranial nerve deficit. Coordination normal.   Skin: Skin is warm and dry. No rash noted. No erythema. No pallor.   Psychiatric: She has a normal mood and affect. Her behavior is normal. Judgment and thought content normal.   Nursing note and vitals reviewed.       Recent Labs      10/14/17   1238  10/15/17   0348   WBC  8.1  8.5   RBC  4.58  4.45   HEMOGLOBIN  14.9  14.2   HEMATOCRIT  43.8  42.0   MCV  95.6  94.4   MCH  32.5  31.9   MCHC  34.0  33.8   RDW  46.5  44.8   PLATELETCT  252  260   MPV  10.8  10.4     Recent Labs      10/14/17   1238  10/15/17   0348   SODIUM  138  139   POTASSIUM  3.7  3.8   CHLORIDE  106  108   CO2  23  25   GLUCOSE  167*  125*   BUN  13  15   CREATININE  0.69  0.66   CALCIUM  8.8  8.7     Recent Labs      10/14/17   1417  10/14/17   2002  10/15/17   0348   APTT  231.6*  231.5*  106.1*                  Assessment/Plan     NSTEMI (non-ST elevated myocardial infarction) (CMS-HCC)- (present on admission)   Assessment & Plan    - continue ASA. High intensity lipitor for now. Continue coreg and cozaar.  - await for LHC. Maintain on heparin gtt.   - PRN NTG and morphine for CP recurrence. Monitor on telemetry.         Hyperglycemia- (present on admission)    Assessment & Plan    - check HbA1c. Monitor BG.        HTN (hypertension)- (present on admission)   Assessment & Plan    - continue cozaar, and coreg. Start norvasc 5mg daily. Continue to monitor BP trend. Continue PRN IV labetalol for significant hypertension.         Remote history of tobacco abuse- (present on admission)   Assessment & Plan    - quit 25 years ago, was a heavy smoker x 1 pack a day per week for 20 years.            Reviewed items::  Labs reviewed, Medications reviewed and Radiology images reviewed  Rand catheter::  No Rand  DVT prophylaxis pharmacological::  Heparin  Ulcer Prophylaxis::  Not indicated

## 2017-10-15 NOTE — PROGRESS NOTES
Pt c/o vague R shoulder pain. Asking for alleve. Not on mar- heat applied. Will monitor. Pt denies sob, chest pain or nuasea. No ectopy on monitor, SR.

## 2017-10-15 NOTE — CARE PLAN
Problem: Communication  Goal: The ability to communicate needs accurately and effectively will improve  Outcome: PROGRESSING AS EXPECTED  POC discussed at bedside - patient verbalizes understanding.  Education on medications and procedures provided.   White board updated, patient encouraged to call for all needs. Calls appropriately. No immediate concerns at this time.       Problem: Safety  Goal: Will remain free from falls  Outcome: PROGRESSING AS EXPECTED  Yesica Arias Fall Risk Assessment:     Last Known Fall: No falls (2 years ago per patient)  Mobility: No limitations  Medications: Cardiovascular or central nervous system meds  Mental Status/LOC/Awareness: Awake, alert, and oriented to date, place, and person  Toileting Needs: No needs  Volume/Electrolyte Status: Use of IV fluids/tube feeds  Communication/Sensory: Visual (Glasses)/hearing deficit  Behavior: Appropriate behavior  Yesica Arais Fall Risk Total: 7  Fall Risk Level: LOW RISK    Universal Fall Precautions:  call light/belongings in reach, bed in low position and locked, siderails up x 2, adequate lighting, clutter free and spill free environment, educate on level of risk, educate to call for assistance, use non-slip footwear    Fall Risk Level Interventions:   TRIAL (TELE 8, NEURO, MED JAI 5) Low Fall Risk Interventions  Place yellow fall risk ID band on patient: completed  Provide patient/family education based on risk assessment: completed  Educate patient/family to call staff for assistance when getting out of bed: completed  Place fall precaution signage outside patient door: completed      Patient Specific Interventions:     Medication: review medications with patient and family and limit combination of prn medications  Mental Status/LOC/Awareness: reinforce falls education and check on patient hourly  Toileting: monitor intake and output/use of appropriate interventions  Volume/Electrolyte Status: monitor abnormal lab values and ensure IV  fluids are appropriate  Communication/Sensory: update plan of care on whiteboard and for visually impaired patients orient to their room surrounding and do not change their surroundings  Behavioral: not applicable  Mobility: ensure bed is locked and in lowest position

## 2017-10-15 NOTE — PROGRESS NOTES
Cardiology Progress Note               Author: Smitha Lino Date & Time created: 10/15/2017  9:25 AM       Consultation for chest pain, elevated troponin (0.24, trending down)    Admitted from [facility] for elevated troponin,    History of hypertension, history of tobacco use, quit 25 years ago    Labs reviewed  Troponin 0.24, trending down    BP = 180/96  HR = 73 NSR     Review of Systems   Respiratory: Negative for cough and shortness of breath.    Cardiovascular: Negative for chest pain, palpitations, orthopnea, claudication, leg swelling and PND.       Physical Exam   Constitutional: She is oriented to person, place, and time. She appears well-developed.   HENT:   Head: Normocephalic.   Eyes: Conjunctivae are normal.   Neck: No JVD present. No thyromegaly present.   Cardiovascular: Normal rate and regular rhythm.    Pulses:       Carotid pulses are 2+ on the right side, and 2+ on the left side.       Radial pulses are 2+ on the right side.        Dorsalis pedis pulses are 2+ on the left side.        Posterior tibial pulses are 2+ on the right side, and 2+ on the left side.   Pulmonary/Chest: She has no wheezes.   Abdominal: Soft.   Musculoskeletal: She exhibits no edema.   Neurological: She is alert and oriented to person, place, and time.   Skin: Skin is warm and dry.       Hemodynamics:  Temp (24hrs), Av.7 °C (98 °F), Min:36.2 °C (97.1 °F), Max:37.1 °C (98.7 °F)  Temperature: 36.4 °C (97.6 °F)  Pulse  Av.6  Min: 65  Max: 74Heart Rate (Monitored): 64  Blood Pressure : (!) 181/96 (Rn informed), NIBP: (!) 190/92     Respiratory:    Respiration: 19, Pulse Oximetry: 92 %           Fluids:     Weight: 84.5 kg (186 lb 4.6 oz)  GI/Nutrition:  Orders Placed This Encounter   Procedures   • Diet NPO     Standing Status:   Standing     Number of Occurrences:   1     Order Specific Question:   Restrict to:     Answer:   Sips with Medications [3]     Lab Results:  Recent Labs      10/14/17   1238   10/15/17   0348   WBC  8.1  8.5   RBC  4.58  4.45   HEMOGLOBIN  14.9  14.2   HEMATOCRIT  43.8  42.0   MCV  95.6  94.4   MCH  32.5  31.9   MCHC  34.0  33.8   RDW  46.5  44.8   PLATELETCT  252  260   MPV  10.8  10.4     Recent Labs      10/14/17   1238  10/15/17   0348   SODIUM  138  139   POTASSIUM  3.7  3.8   CHLORIDE  106  108   CO2  23  25   GLUCOSE  167*  125*   BUN  13  15   CREATININE  0.69  0.66   CALCIUM  8.8  8.7     Recent Labs      10/14/17   1417  10/14/17   2002  10/15/17   0348   APTT  231.6*  231.5*  106.1*         Recent Labs      10/14/17   1238  10/15/17   0348   TROPONINI  0.15*  0.07*             Medical Decision Making, by Problem:  Active Hospital Problems    Diagnosis   • NSTEMI (non-ST elevated myocardial infarction) (CMS-HCC) [I21.4]   • History of tobacco abuse [Z87.891]   • HTN (hypertension) [I10]   • Elevated troponin [R74.8]       Assessment/Plan:    Non-STEMI, on  IV heparin therapy    Plan for coronary angiography today    Nothing by mouth    Hypertensive systolic blood pressure 180s, amlodipine 5 was added    Currently on aspirin 81, Lipitor 80, carvedilol 3.125, losartan 50    No rhythm issues overnight    Echo pending    Reviewed items::  Medications reviewed and Labs reviewed

## 2017-10-15 NOTE — PROGRESS NOTES
Admit profile and skin check complete  Patient refusing PNA and flu vaccines at this time but agrees to read up on more information. Follow up with vaccines prior to D/c. VIS printed and given to patient to read over.

## 2017-10-15 NOTE — PROGRESS NOTES
Yesica Arias Fall Risk Assessment:     Last Known Fall: No falls  Mobility: No limitations  Medications: Cardiovascular or central nervous system meds  Mental Status/LOC/Awareness: Awake, alert, and oriented to date, place, and person  Toileting Needs: No needs  Volume/Electrolyte Status: Use of IV fluids/tube feeds  Communication/Sensory: Visual (Glasses)/hearing deficit  Behavior: Appropriate behavior  Yesica Arias Fall Risk Total: 7  Fall Risk Level: LOW RISK    Universal Fall Precautions:  call light/belongings in reach, bed in low position and locked, wheelchairs and assistive devices out of sight, siderails up x 2, use non-slip footwear, adequate lighting, clutter free and spill free environment, educate to call for assistance, educate on level of risk    Fall Risk Level Interventions:   TRIAL (TELE 8, NEURO, MED JAI 5) Low Fall Risk Interventions  Place yellow fall risk ID band on patient: verified  Provide patient/family education based on risk assessment: verified  Educate patient/family to call staff for assistance when getting out of bed: verified  Place fall precaution signage outside patient door: verified      Patient Specific Interventions:     Medication: review medications with patient and family, assess for medications that can be discontinued or dosage decreased and limit combination of prn medications  Mental Status/LOC/Awareness: reinforce falls education and encourage family to stay with patient  Toileting: provide frquent toileting  Volume/Electrolyte Status: ensure patient remains hydrated, monitor abnormal lab values and ensure IV fluids are appropriate  Communication/Sensory: update plan of care on whiteboard and for visually impaired patients orient to their room surrounding and do not change their surroundings  Behavioral: encourage patient to voice feelings, engage patient in daily activities, administer medication as ordered and instruct/reinforce fall program rationale  Mobility:  schedule physical activity throughout the day, provide comfort measures during transport, dangle prior to standing, utilize bed/chair fall alarm, ensure bed is locked and in lowest position, provide appropriate assistive device and instruct patient to exit bed on their strongest side

## 2017-10-15 NOTE — PROGRESS NOTES
Inquired about trop with . Was told lab defaulted to 0400 despite being entered at 2330. Will await results. Pt c/o vague R back pain. 3/10.

## 2017-10-15 NOTE — PROGRESS NOTES
ptt 231.5 per lab, Erin. Repeated back value. Adjustments made to heparin gtt with two RN verification based on weight based protocol.

## 2017-10-15 NOTE — PROGRESS NOTES
Bedside report received. No overnight events. Patient A&O x 4. BP on high side at 181/96. RA. No complaints of pain. Heparin gtt running at 800 units/hr. currently NPO for possible cardiac cath today. POC discussed with patient. Pt verbalizes understanding. Call light and belongings with in reach. Bed locked and in lowest position, alarm and fall precautions in place.

## 2017-10-16 VITALS
RESPIRATION RATE: 18 BRPM | OXYGEN SATURATION: 93 % | HEIGHT: 64 IN | SYSTOLIC BLOOD PRESSURE: 173 MMHG | HEART RATE: 69 BPM | BODY MASS INDEX: 31.8 KG/M2 | WEIGHT: 186.29 LBS | TEMPERATURE: 97.9 F | DIASTOLIC BLOOD PRESSURE: 77 MMHG

## 2017-10-16 PROBLEM — I21.4 NSTEMI (NON-ST ELEVATED MYOCARDIAL INFARCTION) (HCC): Status: RESOLVED | Noted: 2017-10-14 | Resolved: 2017-10-16

## 2017-10-16 PROBLEM — I25.10 CAD (CORONARY ARTERY DISEASE): Status: ACTIVE | Noted: 2017-10-16

## 2017-10-16 PROBLEM — G47.33 OSA (OBSTRUCTIVE SLEEP APNEA): Status: ACTIVE | Noted: 2017-10-16

## 2017-10-16 PROBLEM — R73.9 HYPERGLYCEMIA: Status: RESOLVED | Noted: 2017-10-15 | Resolved: 2017-10-16

## 2017-10-16 PROBLEM — R79.89 ELEVATED TROPONIN: Status: RESOLVED | Noted: 2017-10-14 | Resolved: 2017-10-16

## 2017-10-16 PROCEDURE — 700105 HCHG RX REV CODE 258: Performed by: INTERNAL MEDICINE

## 2017-10-16 PROCEDURE — 97535 SELF CARE MNGMENT TRAINING: CPT

## 2017-10-16 PROCEDURE — 700102 HCHG RX REV CODE 250 W/ 637 OVERRIDE(OP): Performed by: INTERNAL MEDICINE

## 2017-10-16 PROCEDURE — 99239 HOSP IP/OBS DSCHRG MGMT >30: CPT | Performed by: INTERNAL MEDICINE

## 2017-10-16 PROCEDURE — A9270 NON-COVERED ITEM OR SERVICE: HCPCS | Performed by: INTERNAL MEDICINE

## 2017-10-16 PROCEDURE — 700111 HCHG RX REV CODE 636 W/ 250 OVERRIDE (IP): Performed by: INTERNAL MEDICINE

## 2017-10-16 RX ORDER — LOSARTAN POTASSIUM 100 MG/1
100 TABLET ORAL DAILY
Qty: 30 TAB | Refills: 3 | Status: SHIPPED | OUTPATIENT
Start: 2017-10-16 | End: 2017-10-16

## 2017-10-16 RX ORDER — AMLODIPINE BESYLATE 5 MG/1
5 TABLET ORAL DAILY
Qty: 30 TAB | Refills: 3 | Status: SHIPPED | OUTPATIENT
Start: 2017-10-16

## 2017-10-16 RX ORDER — LOSARTAN POTASSIUM 100 MG/1
100 TABLET ORAL DAILY
Qty: 30 TAB | Refills: 3 | Status: SHIPPED | OUTPATIENT
Start: 2017-10-16

## 2017-10-16 RX ORDER — LOSARTAN POTASSIUM 50 MG/1
100 TABLET ORAL DAILY
Status: DISCONTINUED | OUTPATIENT
Start: 2017-10-16 | End: 2017-10-16 | Stop reason: HOSPADM

## 2017-10-16 RX ORDER — ATORVASTATIN CALCIUM 10 MG/1
10 TABLET, FILM COATED ORAL DAILY
Qty: 30 TAB | Refills: 3 | Status: SHIPPED | OUTPATIENT
Start: 2017-10-16 | End: 2017-10-16

## 2017-10-16 RX ORDER — AMLODIPINE BESYLATE 5 MG/1
5 TABLET ORAL DAILY
Qty: 30 TAB | Refills: 3 | Status: SHIPPED | OUTPATIENT
Start: 2017-10-16 | End: 2017-10-16

## 2017-10-16 RX ORDER — ATORVASTATIN CALCIUM 10 MG/1
10 TABLET, FILM COATED ORAL DAILY
Qty: 30 TAB | Refills: 3 | Status: SHIPPED | OUTPATIENT
Start: 2017-10-16

## 2017-10-16 RX ADMIN — AMLODIPINE BESYLATE 5 MG: 5 TABLET ORAL at 08:46

## 2017-10-16 RX ADMIN — LOSARTAN POTASSIUM 100 MG: 50 TABLET, FILM COATED ORAL at 08:47

## 2017-10-16 RX ADMIN — SODIUM CHLORIDE: 9 INJECTION, SOLUTION INTRAVENOUS at 05:05

## 2017-10-16 ASSESSMENT — ENCOUNTER SYMPTOMS
PND: 0
SHORTNESS OF BREATH: 0
COUGH: 0
CLAUDICATION: 0
PALPITATIONS: 0
ORTHOPNEA: 0

## 2017-10-16 ASSESSMENT — LIFESTYLE VARIABLES: EVER_SMOKED: YES

## 2017-10-16 ASSESSMENT — PAIN SCALES - GENERAL
PAINLEVEL_OUTOF10: 0

## 2017-10-16 NOTE — PROGRESS NOTES
Cardiology Progress Note               Author: Smitha Lino Date & Time created: 10/16/2017  8:40 AM       Consultation for chest pain, elevated troponin (0.24, trending down)    Admitted from [facility] for elevated troponin,    History of hypertension, history of tobacco use, quit 25 years ago    Labs reviewed  Troponin 0.24, trending down    BP = 170/77  HR = 73 NSR     Review of Systems   Respiratory: Negative for cough and shortness of breath.    Cardiovascular: Negative for chest pain, palpitations, orthopnea, claudication, leg swelling and PND.       Physical Exam   Constitutional: She is oriented to person, place, and time. She appears well-developed.   HENT:   Head: Normocephalic.   Eyes: Conjunctivae are normal.   Neck: No JVD present. No thyromegaly present.   Cardiovascular: Normal rate and regular rhythm.    Pulses:       Carotid pulses are 2+ on the right side, and 2+ on the left side.       Radial pulses are 2+ on the right side.        Dorsalis pedis pulses are 2+ on the left side.        Posterior tibial pulses are 2+ on the right side, and 2+ on the left side.   Pulmonary/Chest: She has no wheezes.   Abdominal: Soft.   Musculoskeletal: She exhibits no edema.   Neurological: She is alert and oriented to person, place, and time.   Skin: Skin is warm and dry.   Right radial catheter artery site clean/dry/intact       Hemodynamics:  Temp (24hrs), Av.4 °C (97.6 °F), Min:36 °C (96.8 °F), Max:36.8 °C (98.3 °F)  Temperature: 36.6 °C (97.9 °F)  Pulse  Av  Min: 60  Max: 80   Blood Pressure : (!) 173/77 (nurse notified)     Respiratory:    Respiration: 18, Pulse Oximetry: 93 %           Fluids:        GI/Nutrition:  Orders Placed This Encounter   Procedures   • Diet Order     Standing Status:   Standing     Number of Occurrences:   1     Order Specific Question:   Diet:     Answer:   Cardiac [6]     Lab Results:  Recent Labs      10/14/17   1238  10/15/17   0348   WBC  8.1  8.5   RBC   4.58  4.45   HEMOGLOBIN  14.9  14.2   HEMATOCRIT  43.8  42.0   MCV  95.6  94.4   MCH  32.5  31.9   MCHC  34.0  33.8   RDW  46.5  44.8   PLATELETCT  252  260   MPV  10.8  10.4     Recent Labs      10/14/17   1238  10/15/17   0348   SODIUM  138  139   POTASSIUM  3.7  3.8   CHLORIDE  106  108   CO2  23  25   GLUCOSE  167*  125*   BUN  13  15   CREATININE  0.69  0.66   CALCIUM  8.8  8.7     Recent Labs      10/14/17   2002  10/15/17   0348  10/15/17   1016   APTT  231.5*  106.1*  84.7*         Recent Labs      10/14/17   1238  10/15/17   0348  10/15/17   1016   TROPONINI  0.15*  0.07*  0.04     Recent Labs      10/15/17   1016   TRIGLYCERIDE  143   HDL  46   LDL  89         Medical Decision Making, by Problem:  Active Hospital Problems    Diagnosis   • NSTEMI (non-ST elevated myocardial infarction) (CMS-HCC) [I21.4]   • History of tobacco abuse [Z87.891]   • HTN (hypertension) [I10]   • Elevated troponin [R74.8]       Assessment/Plan:    Non-STEMI ( trop 0.15 )    S/p C 10/15/17 Patent coronary arteries with mild diffuse nonobstructive CAD, EF 73%, normal wall motion    Hypertensive systolic blood pressure 170s (on losartan 50 mg daily at home), amlodipine 5 mg was added, losartan was increased to 100 mg, we discussed monitoring blood pressure at home    Patient does have a diagnosis of sleep apnea, however she hasn't been using her machine for couple of years now, she will follow-up with pulmonary as an outpatient    She will follow-up with her PCP    She lives in [place]      No rhythm issues overnight    Echo 10/15/17, noted EF 55%, no valvular abnormality    Stable from cardiac stent to go home today    Reviewed items::  Medications reviewed and Labs reviewed

## 2017-10-16 NOTE — PROCEDURES
DATE OF PROCEDURE:  10/15/2017    PROCEDURE:  Cardiac catheterization.  A.  Left heart catheterization.  B.  Left ventriculography.  C.  Selective coronary angiography.  D.  Right radial artery approach.    PREPROCEDURE DIAGNOSES:  1.  Non-ST elevation myocardial infarction.  2.  Hypertension.    POST-PROCEDURE DIAGNOSES:  1.  Patent coronary arteries with mild diffuse nonobstructive coronary   atheromatous disease.  2.  Left ventricular ejection fraction 73% with normal wall motion.    PHYSICIAN:  Yared Munroe MD    REFERRING PHYSICIAN:  Jacqui Mckeon MD    COMPLICATIONS:  None.    MEDICATIONS:  1.  Versed 2 mg IV.  2.  Fentanyl 100 mcg IV.  3.  Lidocaine 2% subcutaneous.  4.  Heparin intravenous infusion was discontinued.  5.  Nitroglycerin 100 mcg IA.  6.  Verapamil 2.5 mg IA.  7.  Hydralazine 10 mg IV.    DESCRIPTION OF PROCEDURE:  After informed consent was obtained, the patient   was brought to the cardiac catheterization laboratory.    After establishing the presence of adequate collateral circulation to the   right hand with a pressure and oximetry-guided Bryan's test, the volar surface   of the right wrist was prepped, draped and anesthetized in the usual manner.    Using a modified Seldinger technique, a 6-Spanish x 10 cm introducer sheath was   inserted in the right radial artery.  Nitroglycerin and verapamil were given   via the side port.    Next, a 4-Spanish JL 3.5 left coronary catheter was inserted in the ostium of   the left coronary artery and left coronary angiograms were obtained in various   projections.    All subsequent catheter exchanges were done over an exchange length J-tipped   wire.    Next, a 4-Spanish JR 4.0 right coronary catheter was inserted in the ostium of   the right coronary and right coronary angiograms were obtained in various   projections.    Next, a 4-Spanish pigtail catheter was inserted in the left ventricle under   fluoroscopic guidance.  A single plane NEGRETE left  ventricular angiogram was   performed.  Pre and post angiogram LVEDP, LV and aortic pressures were   obtained.    At the end the procedure, catheters were removed.  Hemostasis was achieved   with a wrist band device.  Patient tolerated the procedure well.    FINDINGS:  I.  HEMODYNAMICS:  1.  LEFT HEART PRESSURES:  LVEDP 27 mmHg.  Left ventricular systolic pressure 149 mmHg.  Central aortic pressure systolic 144, diastolic 75, mean of 107 mmHg.  2.  LEFT VENTRICULOGRAPHY:  Left ventricular chamber size and wall motion are   normal.  Calculated ejection fraction 73%.  II.  CORONARY ARTERIOGRAPHY:  1.  LEFT MAIN ARTERY:  Left main vessel is angiographically normal and   trifurcates into a left anterior descending artery, a ramus intermedius vessel,   and circumflex artery.  2.  LEFT ANTERIOR DESCENDING ARTERY:  The LAD gives rise to a normal   complement of septal branches and an early long bifurcating diagonal branch then  extends around the apex.  The mid LAD has a smooth 40% stenosis.  The proximal   diagonal branch has a 30% stenosis.  3.  CIRCUMFLEX ARTERY:  The circumflex consists of 2 marginal branches.  The   first marginal branch has diffuse 40% vessel narrowing.  The remainder of the   vessel is widely patent.  4.  RAMUS INTERMEDIUS:  The ramus is a small caliber long vessel that is   widely patent.  5.  RIGHT CORONARY ARTERY:  The RCA is a moderately large caliber dominant   vessel, gives rise to a right ventricular branch, bifurcating posterior   descending artery and a long posterolateral branch.  The right coronary artery   is widely patent with mild smooth focal atheroma.    PLAN:  Medical therapy.       ____________________________________     MD ADILENE DRISCOLL / TRINITY    DD:  10/15/2017 17:50:36  DT:  10/15/2017 18:27:12    D#:  [id number]  Job#:  [id number]

## 2017-10-16 NOTE — PROGRESS NOTES
All discharge paperwork reviewed with patient and new medication scripts given. Pt verbalized understand and states she will follow up with her PCP in [place] in one week or as soon as possible. Pt denies any chest pain or SOB. Requested to ambulate off unit with  instead of waiting for wheelchair, leaving in private vehicle to go home to [place].

## 2017-10-16 NOTE — CATH LAB
Immediate Post-Operative Note      PreOp Diagnosis:   ACS    PostOp Diagnosis:   Mild CAD  EF 73% normal wall motion    Procedure(s) :  Coronary Angiography, Left Heart Catheterization, Left Ventriculography    Surgeon(s):  Yared Munroe M.D.    Type of Anesthesia: Moderate Sedation    Specimen: None        Complications: none      Yared Munroe M.D.  10/15/2017 5:41 PM

## 2017-10-16 NOTE — THERAPY
Pt s/p r/o NSTEMI with elevated troponins seen for PT cardiac rehab phase I eval. Pt receptive to education regarding exercise walking program as well as RPE scale, HR goal, and talk test. She declined formal walking assessment and reported comfort with monitoring HR during activity. At this time, pt does not have further acute skilled PT need. Please refer pt to outpatient cardiac rehab when in appropriate stage of recovery.

## 2017-10-16 NOTE — DISCHARGE INSTRUCTIONS
Discharge Instructions    Discharged to home by car with relative. Discharged via wheelchair, hospital escort: Refused.  Special equipment needed: Not Applicable    Be sure to schedule a follow-up appointment with your primary care doctor or any specialists as instructed.     Discharge Plan:   Diet Plan: Discussed  Activity Level: Discussed  Confirmed Follow up Appointment: Patient to Call and Schedule Appointment  Confirmed Symptoms Management: Discussed  Medication Reconciliation Updated: Yes  Influenza Vaccine Indication: Patient Refuses    I understand that a diet low in cholesterol, fat, and sodium is recommended for good health. Unless I have been given specific instructions below for another diet, I accept this instruction as my diet prescription.   Other diet: Cardiac    Special Instructions: None    · Is patient discharged on Warfarin / Coumadin?   No     · Is patient Post Blood Transfusion?  No    Depression / Suicide Risk    As you are discharged from this [facility] facility, it is important to learn how to keep safe from harming yourself.    Recognize the warning signs:  · Abrupt changes in personality, positive or negative- including increase in energy   · Giving away possessions  · Change in eating patterns- significant weight changes-  positive or negative  · Change in sleeping patterns- unable to sleep or sleeping all the time   · Unwillingness or inability to communicate  · Depression  · Unusual sadness, discouragement and loneliness  · Talk of wanting to die  · Neglect of personal appearance   · Rebelliousness- reckless behavior  · Withdrawal from people/activities they love  · Confusion- inability to concentrate     If you or a loved one observes any of these behaviors or has concerns about self-harm, here's what you can do:  · Talk about it- your feelings and reasons for harming yourself  · Remove any means that you might use to hurt yourself (examples: pills, rope, extension cords,  firearm)  · Get professional help from the community (Mental Health, Substance Abuse, psychological counseling)  · Do not be alone:Call your Safe Contact- someone whom you trust who will be there for you.  · Call your local CRISIS HOTLINE 938-4142 or 215-306-0205  · Call your local Children's Mobile Crisis Response Team [place] (930) 000-8642 or www.PaeDae  · Call the toll free National Suicide Prevention Hotlines   · National Suicide Prevention Lifeline 082-958-BNKL (1993)  · National Hope Line Network 800-SUICIDE (211-8213)    Radial Site Care  Refer to this sheet in the next few weeks. These instructions provide you with information about caring for yourself after your procedure. Your health care provider may also give you more specific instructions. Your treatment has been planned according to current medical practices, but problems sometimes occur. Call your health care provider if you have any problems or questions after your procedure.  WHAT TO EXPECT AFTER THE PROCEDURE  After your procedure, it is typical to have the following:  · Bruising at the radial site that usually fades within 1-2 weeks.  · Blood collecting in the tissue (hematoma) that may be painful to the touch. It should usually decrease in size and tenderness within 1-2 weeks.  HOME CARE INSTRUCTIONS  · Take medicines only as directed by your health care provider.  · You may shower 24-48 hours after the procedure or as directed by your health care provider. Remove the bandage (dressing) and gently wash the site with plain soap and water. Pat the area dry with a clean towel. Do not rub the site, because this may cause bleeding.  · Do not take baths, swim, or use a hot tub until your health care provider approves.  · Check your insertion site every day for redness, swelling, or drainage.  · Do not apply powder or lotion to the site.  · Do not flex or bend the affected arm for 24 hours or as directed by your health care  provider.  · Do not push or pull heavy objects with the affected arm for 24 hours or as directed by your health care provider.  · Do not lift over 10 lb (4.5 kg) for 5 days after your procedure or as directed by your health care provider.  · Ask your health care provider when it is okay to:  ¨ Return to work or school.  ¨ Resume usual physical activities or sports.  ¨ Resume sexual activity.  · Do not drive home if you are discharged the same day as the procedure. Have someone else drive you.  · You may drive 24 hours after the procedure unless otherwise instructed by your health care provider.  · Do not operate machinery or power tools for 24 hours after the procedure.  · If your procedure was done as an outpatient procedure, which means that you went home the same day as your procedure, a responsible adult should be with you for the first 24 hours after you arrive home.  · Keep all follow-up visits as directed by your health care provider. This is important.  SEEK MEDICAL CARE IF:  · You have a fever.  · You have chills.  · You have increased bleeding from the radial site. Hold pressure on the site.  SEEK IMMEDIATE MEDICAL CARE IF:  · You have unusual pain at the radial site.  · You have redness, warmth, or swelling at the radial site.  · You have drainage (other than a small amount of blood on the dressing) from the radial site.  · The radial site is bleeding, and the bleeding does not stop after 30 minutes of holding steady pressure on the site.  · Your arm or hand becomes pale, cool, tingly, or numb.     This information is not intended to replace advice given to you by your health care provider. Make sure you discuss any questions you have with your health care provider.     Document Released: 01/20/2012 Document Revised: 01/08/2016 Document Reviewed: 07/06/2015  [employer] Patient Education ©2016 [employer]

## 2017-10-16 NOTE — DISCHARGE SUMMARY
HOSPITAL MEDICINE DISCHARGE SUMMARY     Name: Sabrina Aceves  MRN: [id number]  : 1945  Admit Date: 10/14/2017  Discharge Date: 10/16/2017  Attending Provider: Otis Krishnamurthy M.D.  Admitting Provider: Redd Lazaro M.D.  Primary Care Physician: Pcp Unknown     DISCHARGE DIAGNOSES:   Active Problems:    Remote history of tobacco abuse POA: Yes    HTN (hypertension) POA: Yes    KENIA (obstructive sleep apnea) POA: Yes    CAD (coronary artery disease) POA: Yes  Resolved Problems:    NSTEMI (non-ST elevated myocardial infarction) (CMS-[facility]) POA: Yes    Elevated troponin POA: Yes    Hyperglycemia POA: Yes        SUMMARY OF EVENTS LEADING TO ADMISSION:   This is a 72-year-old female with   hypertension, remote history of smoking, who was admitted on 10/14/2017 with   chest pain.  She was transferred from [facility], where she was noted to   have elevated troponin there of 0.24.  She received few aspirin and   nitroglycerin and was transferred to [facility] for further management.    For further details of history of present illness, past medical, social,   family histories, allergies and medications, please refer to admission H and P   by Dr. Beatriz Pérez on 10/14/2017.    HOSPITAL COURSE:  The patient was admitted to the hospitalist service after   being initially evaluated in the emergency department.  Her repeat labs here   were unimpressive except for an elevated blood glucose.  Hemoglobin A1c was   6.3.  Cardiology was consulted, and patient was started on heparin drip, and   continued on aspirin and statin.  Echocardiogram was obtained, which showed   ejection fraction of 55% without significant valvular abnormalities.  LDL was   89.    She underwent cardiac catheterization with left heart cath, which showed mild   nonobstructive coronary artery disease with ejection fraction of 73%.  She did   not require any interventions in the cath lab.    She was monitored overnight, and she remained  hemodynamically stable, albeit   her blood pressure was high.  Her Cozaar was adjusted to 100 mg daily and   Norvasc was added with better blood pressure control.  She was counseled on   monitoring her blood pressure at home and followup with her primary care   physician.  She was also counseled on further management of her obstructive   sleep apnea and to start her using her CPAP again.    She remained hemodynamically stable and afebrile.  With her clinical   improvement, she was deemed ready to be discharged from the hospital as she   did not have any further inpatient needs.  Patient felt comfortable going   home.  The discharge plan was discussed with the patient, with which she was   agreeable to.    The patient was subsequently sent home in improved and stable condition.    DISCHARGE DISPOSITION: recovered as expected.      FOLLOW-UP ISSUES:   1. Mild non-obstructive CAD - continue baby aspirin, and low dose lipitor. Follow-up with PCP in 1-2 weeks.      2. KNEIA - patient states she will start using her CPAP again, and follow-up with her PCP.       3. HTN - she will be continued on cozaar 100mg daily, and norvasc 5 mg daily. She will monitor her BP at home, and ff-up with her PCP.      CHANGES IN MANAGEMENT OF CHRONIC CONDITION: As above.      PENDING TESTS: none     FOLLOW-UP TESTS ORDERED POST DISCHARGE: none     DISCHARGE MEDICATIONS:         Medication Sig Start Date End Date   amlodipine (NORVASC) 5 MG Tab Take 1 Tab by mouth every day. 10/16/17     losartan (COZAAR) 100 MG Tab Take 1 Tab by mouth every day. 10/16/17     aspirin EC (ECOTRIN) 81 MG Tablet Delayed Response Take 1 Tab by mouth every day. 10/16/17     atorvastatin (LIPITOR) 10 MG Tab Take 1 Tab by mouth every day. 10/16/17              FOLLOW-UP APPOINTMENTS:   No follow-up provider specified.       1. PCP - 1-2 weeks.      For further details on discharge medications, patient education, diet, and activity, please refer to electronic copy of  discharge instructions.         TIME SPENT: 45 minutes, with greater than 50% of the time spent on face-to-face encounter, addressing medical issues, coordination of care, counseling, discharge planning, medication reconciliation, and documentation.       ____________________________________     BHUPENDRA Garcia / [place]    DD:  10/16/2017 11:44:14  DT:  10/16/2017 11:53:30    D#:  [id number]  Job#:  [id number]
